# Patient Record
(demographics unavailable — no encounter records)

---

## 2024-10-28 NOTE — REVIEW OF SYSTEMS
[Patient Intake Form Reviewed] : Patient intake form was reviewed [Ear Pain] : no ear pain [Ear Itch] : no ear itch [Hearing Loss] : no hearing loss [Dizziness] : no dizziness [Vertigo] : no vertigo [Recurrent Ear Infections] : no recurrent ear infections [Lightheadedness] : no lightheadedness [Ear Drainage] : no ear drainage [Ear Noises] : no ear noises [Negative] : Ear

## 2025-01-22 NOTE — ASSESSMENT
[FreeTextEntry1] : dark solar lentigo R periorbital no texture, good candidate for IPL Risks and benefits of Intense Pulsed Light Therapy were discussed including swelling, discoloration, lack of response, and need for multiple treatments; also discussed risk swelling around R eye  discussed that this may be early macular SK and not completely respond;  pt. will consider doing full face IPL @ [facility]  f/u in Spring for proceudre

## 2025-01-22 NOTE — HISTORY OF PRESENT ILLNESS
[de-identified] : Pt referred for eval of treatment of dark brown solar lentigo near right eye; present many years, no txs in past

## 2025-01-22 NOTE — PHYSICAL EXAM
[FreeTextEntry3] : Dark brown regular macule Right periorbital some other darker lentigines;  macular SK left temple

## 2025-02-13 NOTE — HISTORY OF PRESENT ILLNESS
[de-identified] : 71 y/o female here for follow up of b/l knee pain. Her pain is mild. She has been receiving bilateral euflexxa injections with good relief over the past two years. she comes in today for repeat bilateral euflexxa injections

## 2025-02-13 NOTE — REASON FOR VISIT
[Follow-Up Visit] : a follow-up visit for [Knee Pain] : knee pain [FreeTextEntry2] : Bilateral knee pain, Euflexxa #1  Lot# [id number], Exp: 12/16/25

## 2025-02-13 NOTE — PROCEDURE
[de-identified] :  I injected the patient's  bilateral knees Euflexxa #1 Lot# [id number], Exp: 12/16/25. Knee injection visco-supplementation: I discussed at length with the patient the planned steroid and lidocaine injection for primary osteoarthritis. The risks, benefits, convalescence and alternatives were reviewed and pt consented for injection. The possible side effects discussed included but were not limited to: pain, swelling, heat and redness. There symptoms are generally mild but if they are extensive then contact the office. Giving pain relievers by mouth such as NSAIDs or Tylenol can generally treat the reactions to injection. Rare cases of infection have been noted. Rash, hives and itching may occur post injection. If you have muscle pain or cramps, flushing and or swelling of the face, rapid heart beat, nausea, dizziness, fever, chills, headache, difficulty breathing, swelling in the arms or legs, or have a prickly feeling of your skin, contact a health care provider immediately. Following this discussion, the knee was prepped with alcohol and under sterile condition the injection was performed through a superolateral injection site with a 20 gauge needle. The needle was introduced into the joint, aspiration was performed to ensure intra-articular placement and the medication was injected. Upon withdrawal of the needle the site was cleaned with alcohol and a band aid applied. The patient tolerated the injection well and there were no adverse effects. Post injection instructions included no strenuous activity for 24 hours, cryotherapy and if there are any adverse effects to contact the office.

## 2025-02-20 NOTE — PROCEDURE
[de-identified] : I injected the patient's right and left knee Euflexxa #2 Lot# [id number], Exp: 12/16/25 Knee injection visco-supplementation: I discussed at length with the patient the planned viscosupplementation injection for primary osteoarthritis. The risks, benefits, convalescence and alternatives were reviewed and pt consented for injection. The possible side effects discussed included but were not limited to: pain, swelling, heat and redness. There symptoms are generally mild but if they are extensive then contact the office. Giving pain relievers by mouth such as NSAIDs or Tylenol can generally treat the reactions to injection. Rare cases of infection have been noted. Rash, hives and itching may occur post injection. If you have muscle pain or cramps, flushing and or swelling of the face, rapid heart beat, nausea, dizziness, fever, chills, headache, difficulty breathing, swelling in the arms or legs, or have a prickly feeling of your skin, contact a health care provider immediately. Following this discussion, the knee was prepped with alcohol and under sterile condition the injection was performed through a superolateral injection site with a 20 gauge needle. The needle was introduced into the joint, aspiration was performed to ensure intra-articular placement and the medication was injected. Upon withdrawal of the needle the site was cleaned with alcohol and a band aid applied. The patient tolerated the injection well and there were no adverse effects. Post injection instructions included no strenuous activity for 24 hours, cryotherapy and if there are any adverse effects to contact the office.

## 2025-02-20 NOTE — DISCUSSION/SUMMARY
[de-identified] : Patient given #2 of 3 planned bilateral knee euflexxa injections today. She tolerated injections well. She will return next week for injections as scheduled.

## 2025-02-20 NOTE — HISTORY OF PRESENT ILLNESS
[de-identified] : Patient with known history of bilateral knee OA presents for #2 of 3 planned bilateral euflexxa injections today.

## 2025-02-27 NOTE — DISCUSSION/SUMMARY
[de-identified] : Patient had bilateral knee Euflexxa injection #3 in the office today which she tolerated well.  Follow-up 6 months for repeat injections if needed

## 2025-02-27 NOTE — REASON FOR VISIT
[Follow-Up Visit] : a follow-up visit for [Knee Pain] : knee pain [FreeTextEntry2] : Bilateral knee pain, Euflexxa #3 Lot# [id number], Exp: 12/16/25.

## 2025-02-27 NOTE — PROCEDURE
[de-identified] :  I injected the patient'sBilateral knee , Euflexxa #3 Lot# [id number], Exp: 12/16/25. Knee injection visco-supplementation: I discussed at length with the patient the planned steroid and lidocaine injection for primary osteoarthritis. The risks, benefits, convalescence and alternatives were reviewed and pt consented for injection. The possible side effects discussed included but were not limited to: pain, swelling, heat and redness. There symptoms are generally mild but if they are extensive then contact the office. Giving pain relievers by mouth such as NSAIDs or Tylenol can generally treat the reactions to injection. Rare cases of infection have been noted. Rash, hives and itching may occur post injection. If you have muscle pain or cramps, flushing and or swelling of the face, rapid heart beat, nausea, dizziness, fever, chills, headache, difficulty breathing, swelling in the arms or legs, or have a prickly feeling of your skin, contact a health care provider immediately. Following this discussion, the knee was prepped with alcohol and under sterile condition the injection was performed through a superolateral injection site with a 20 gauge needle. The needle was introduced into the joint, aspiration was performed to ensure intra-articular placement and the medication was injected. Upon withdrawal of the needle the site was cleaned with alcohol and a band aid applied. The patient tolerated the injection well and there were no adverse effects. Post injection instructions included no strenuous activity for 24 hours, cryotherapy and if there are any adverse effects to contact the office.